# Patient Record
Sex: FEMALE | Race: WHITE
[De-identification: names, ages, dates, MRNs, and addresses within clinical notes are randomized per-mention and may not be internally consistent; named-entity substitution may affect disease eponyms.]

---

## 2017-12-22 ENCOUNTER — HOSPITAL ENCOUNTER (EMERGENCY)
Dept: HOSPITAL 80 - CED | Age: 62
Discharge: HOME | End: 2017-12-22
Payer: COMMERCIAL

## 2017-12-22 VITALS
OXYGEN SATURATION: 97 % | SYSTOLIC BLOOD PRESSURE: 128 MMHG | TEMPERATURE: 99.3 F | DIASTOLIC BLOOD PRESSURE: 70 MMHG | HEART RATE: 83 BPM | RESPIRATION RATE: 20 BRPM

## 2017-12-22 DIAGNOSIS — J10.1: Primary | ICD-10-CM

## 2017-12-22 NOTE — EDPHY
H & P


Time Seen by Provider: 12/22/17 09:32


HPI/ROS: 





CHIEF COMPLAINT:  Cough, body aches, back pain





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 

emergency department with multiple complaints. She states she has developed a 

significant nonproductive cough for the past 2 days.  She now has diffuse body 

aches.  She describes back pain.  She has noticed increased heart rate when she 

is walking up stairs.  She has no leg pain or swelling.  No abdominal pain. No 

nausea vomiting. She denies fevers or chills. Patient states she is concerned 

she has pneumonia or the flu.





REVIEW OF SYSTEMS:  


My complete review of systems is negative except as mentioned in the HPI.


Past Medical/Surgical History: 





Includes Sjogren syndrome, hypothyroidism, depression, carrier of 

hemochromatosis





Past surgical history:  Includes cataract surgery, retinal detachment, 

appendectomy, oophorectomy, C-spine fusion





Smoking Status: Never smoked


Physical Exam: 





36, 110/77, 97, 16, 96% on room air


GENERAL:  No acute distress, alert.


HEENT:  Eyes normal to inspection, normal pharynx, no signs of dehydration.


NECK:  No thyromegaly, no lymphadenopathy, supple.


RESPIRATORY:  Clear to auscultation bilaterally, no rales, rhonchi or wheezing.


CVS:  Regular rate and rhythm, no rubs, murmurs, or gallops.


ABDOMEN:  Soft, nontender, nondistended, no organomegaly.


BACK:  Normal to inspection, no CVA tenderness.


SKIN:  Normal color, no rash, warm, dry.  No pallor.


EXTREMITIES:  No pedal edema, no calf tenderness, no Homans sign or cords, no 

joint swelling.


NEURO/PSYCH:  Alert and oriented x3, normal mood and affect, normal motor 

sensory exam.  No obvious cranial nerve deficit.


Constitutional: 





 Initial Vital Signs











Temperature (C)  36 C   12/22/17 09:33


 


Heart Rate  97   12/22/17 09:33


 


Respiratory Rate  16   12/22/17 09:33


 


Blood Pressure  110/77   12/22/17 09:33


 


O2 Sat (%)  96   12/22/17 09:33








 











O2 Delivery Mode               Room Air














Allergies/Adverse Reactions: 


 





SANCERT Allergy (Uncoded 12/22/17 09:36)


 








Home Medications: 














 Medication  Instructions  Recorded


 


Cevimeline HCl [Evoxac]  09/13/17


 


Escitalopram Oxalate [Lexapro 10  09/13/17





MG]  


 


Levoxyl  09/13/17


 


Oseltamivir Phosphate [Tamiflu] 75 mg PO BID 5 Days  capsule 12/22/17


 


Wellbutrin     12/22/17














Medical Decision Making





- Diagnostics


Imaging Results: 





 Imaging Impressions





Chest X-Ray  12/22/17 09:25


Impression: Mild airways disease. No pneumonia.


 











ED Course/Re-evaluation: 





In the emergency department I discussed possible etiologies with the patient.  

Chest x-ray and flu swab was ordered.





Chest x-ray:  Please refer the dictated report by Dr. Oppenheimer.  No focal 

infiltrate.





Patient was flu a positive.





I discussed the results with the patient.  Patient was given warnings and 

instructions for return.  She will be given a prescription of Tamiflu.  She is 

given warnings prior to leaving.


Differential Diagnosis: 





My differential includes but is not limited to pneumonia, influenza, bronchitis

, bacteremia, sepsis





- Data Points


Laboratory Results: 





 











  12/22/17





  09:20


 


Influenza A,B Rapid  POSITIVE FOR FLU A  H 





   (NEGATIVE) 














Departure





- Departure


Disposition: Home, Routine, Self-Care


Clinical Impression: 


 Influenza A





Condition: Good


Instructions:  Influenza (ED)


Additional Instructions: 


You had a positive influenza a test.  Your x-ray did not show any focal 

infiltrates (pneumonia) at this time.  Return with increasing shortness of 

breath, worsening body aches, or any other concerns.


Referrals: 


MARV ROSALES [Primary Care Provider] - 5-7 days, call for appt.


Prescriptions: 


Oseltamivir Phosphate [Tamiflu] 75 mg PO BID 5 Days  capsule

## 2018-05-09 ENCOUNTER — HOSPITAL ENCOUNTER (EMERGENCY)
Dept: HOSPITAL 80 - CED | Age: 63
Discharge: HOME | End: 2018-05-09
Payer: COMMERCIAL

## 2018-05-09 VITALS — SYSTOLIC BLOOD PRESSURE: 120 MMHG | DIASTOLIC BLOOD PRESSURE: 69 MMHG

## 2018-05-09 DIAGNOSIS — G56.22: Primary | ICD-10-CM

## 2018-05-09 PROCEDURE — L3925 FO PIP DIP JNT/SPRNG PRE OTS: HCPCS

## 2018-05-09 NOTE — EDPHY
H & P


Stated Complaint: c/o  Little finger weaknes, numbness and inc. neck pain since 

this am


Time Seen by Provider: 05/09/18 15:12


HPI/ROS: 





CHIEF COMPLAINT:  Paresthesias and weakness





HISTORY OF PRESENT ILLNESS:  The patient is referred to the emergency 

department for evaluation of paresthesias and weakness involving the left hand.

  The patient has a history of prior cervical spine fusion performed in 2008.  

She has been experiencing intermittent worsening neck pain and decreased range 

of motion for some time.  She is currently under the care of a neurosurgeon in 

[place].  The patient did have recent cervical spine x-rays which 

demonstrated the possibility of a loosening hardware at C7.





The patient reports that today she is notice paresthesias involving her 4th and 

5th fingers.  She is having inability to flex and extend her left 5th finger.





REVIEW OF SYSTEMS:


A comprehensive 10 point review of systems is otherwise negative aside from 

elements mentioned in the history of present illness.


Source: Patient


Exam Limitations: No limitations





- Personal History


Current Tetanus Diphtheria and Acellular Pertussis (TDAP): No


Tetanus Vaccine Date: < 10 YEARS





- Medical/Surgical History


Hx Asthma: No


Hx Chronic Respiratory Disease: No


Hx Diabetes: No


Hx Cardiac Disease: No


Hx Renal Disease: No


Hx Cirrhosis: No


Hx Alcoholism: No


Hx HIV/AIDS: No


Hx Splenectomy or Spleen Trauma: No


Other PMH: sjogrens, hypothyroid, depression





- Social History


Smoking Status: Never smoked





- Physical Exam


Exam: 





General Appearance:  Alert, no distress


Respiratory:  There are no retractions, lungs are clear to auscultation


Cardiovascular:  Regular rate and rhythm


Gastrointestinal:  Abdomen is soft and nontender, no masses, bowel sounds normal


Neurological:  Patient is notice have decreased sensation to light touch along 

the 5th and 4th left fingers.  The patient is unable to fully extend her left 

5th finger at the DIP joint.  She also is having decreased range of motion with 

flexion to the 4th and 5th fingers.


Skin:  Warm and dry, no rashes


Musculoskeletal:  Neck is supple nontender


Extremities:  symmetrical, full range of motion








Constitutional: 


 Initial Vital Signs











Temperature (C)  36.6 C   05/09/18 15:15


 


Heart Rate  74   05/09/18 15:15


 


Respiratory Rate  18   05/09/18 15:15


 


Blood Pressure  139/88 H  05/09/18 15:15


 


O2 Sat (%)  96   05/09/18 15:15








 











O2 Delivery Mode               Room Air














Allergies/Adverse Reactions: 


 





SANCERT Allergy (Uncoded 12/22/17 09:36)


 








Home Medications: 














 Medication  Instructions  Recorded


 


NK [No Known Home Meds]  05/09/18














Medical Decision Making





- Diagnostics


Imaging Results: 


 Imaging Impressions





Cervical Spine MRI  05/09/18 16:41


Impression: No source for upper extremity weakness identified. 


 


Results called to Dr. Jeison Díaz at 6:22 PM.


 


 











ED Course/Re-evaluation: 





The patient presents to the ED with a neuropathy that appears to be an ulnar 

distribution.  Given her history of cervical spine fusion a MRI of the neck was 

ordered which demonstrates no significant changes from her prior MRI 1 year 

ago.  The patient's examination appears to be most consistent with a ulnar 

peripheral neuropathy.  The patient will be placed in a supportive splint.  I 

have asked her to follow up with our on-call neurologist for consideration of 

EMG testing.  The patient has been instructed to return to the ED for the 

development of any new neurologic symptoms as this would not be expected with a 

diagnosis of a peripheral neuropathy of the ulnar nerve.








Differential Diagnosis: 





Differential diagnosis considered includes cervical stenosis, cervical 

radiculopathy, peripheral neuropathy, myopathy





Departure





- Departure


Disposition: Home, Routine, Self-Care


Clinical Impression: 


Ulnar nerve neuropathy


Qualifiers:


 Laterality: left Qualified Code(s): G56.22 - Lesion of ulnar nerve, left upper 

limb





Condition: Good


Instructions:  Peripheral Neuropathy (ED), Paresthesia (ED)


Additional Instructions: 


1. Please wear splint for support.


2. Please schedule a follow-up appointment with a neurologist, Dr. Ronen Rico

, you have been referred to for further evaluation of a possible ulnar nerve 

neuropathy.


3. I would not expect you develop any new symptoms of numbness or weakness 

outside of your left upper extremity.  Please return to the ED for the 

development of any new numbness or weakness .








Referrals: 


Ronen Rico, DO [Medical Doctor] - As per Instructions

## 2018-05-09 NOTE — EDPHY
H & P


Time Seen by Provider: 05/09/18 15:12


HPI/ROS: 





HPI





CHIEF COMPLAINT:  Neck pain, trouble fingers and left hand





HISTORY OF PRESENT ILLNESS:  Patient is a 62-year-old female she is otherwise 

healthy, she had a remote history of cervical spinal fusion done approximately 

10 years ago, she presents emergency room stating that she is due to have a 

revision of this spinal fusion and is currently undergoing consultation with a 

neurosurgeon.  However this morning she woke up with some worsening neck pain 

and trouble with her 4th and 5th digit on the left hand.  They are somewhat 

hyperreflexic in.  She states they are numb.  She is concerned that her neck 

may be getting worse.


She denies any chest pain or shortness of breath, denies headache.  Denies arm 

weakness.


She does report numbness and tingling in her 4th and 5th digit.  And that they 

are weak.  The 5th digit is noted to be flexed in.  She does have some 

radicular pain from her neck on the left side going down her left arm.





Past Medical History:  Sjogren's, carrier of hemochromatosis





Past Surgical History:  Cervical spine fusion, overactive me, retinal detachment

, cataract, appendectomy





Social History:  Denies drugs alcohol tobacco.





Family History:








ROS   


REVIEW OF SYSTEMS:


A comprehensive 10 point review of systems is otherwise negative aside from 

elements mentioned in the history of present illness.








Exam   


Constitutional   triage nursing summary reviewed, vital signs reviewed, awake/

alert. 


Eyes   normal conjunctivae and sclera, EOMI, PERRLA. 


HENT   normal inspection, atraumatic, moist mucus membranes, no epistaxis, neck 

supple/ no meningismus, no raccoon eyes. 


Respiratory   clear to auscultation bilaterally, normal breath sounds, no 

respiratory distress, no wheezing. 


Cardiovascular   rate normal, regular rhythm, no murmur, no edema, distal 

pulses normal. 


Gastrointestinal   soft, non-tender, no rebound, no guarding, normal bowel 

sounds, no distension, no pulsatile mass. 


Genitourinary   no CVA tenderness. 


Musculoskeletal  no midline vertebral tenderness, full range of motion, no calf 

swelling, no tenderness of extremities, no meningismus, good pulses, 

neurovascularly intact.


Skin   pink, warm, & dry, no rash, skin atraumatic. 


Neurologic   Left ARM:  This is a good distal pulse.  Somewhat weak  

strength on the left hand, additionally the 4th and 5th digit have some 

paresthesias, as well as appear to be flexed, and somewhat weaker then the 

other fingers. 


 awake, alert and oriented x 3, AAOx3, moves all 4 extremities equally, motor 

intact, sensory intact, CN II-XII intact, normal cerebellar, normal vision, 

normal speech. 


Psychiatric   normal mood/affect. 


Heme/Lymph/Immune   no lymphadenopathy.





Differential Diagnosis:  Includes not limited to in a particular order ulnar 

nerve neuropathy, complication of cervical fusion, hardware loosening, nerve 

root compression





Medical Decision Making:  After lengthy discussion with the patient recommend 

urgent MRI either [facility] emergency room or [facility] 

emergency room the patient prefers to go to [facility] 

emergency room.


Plan will be for urgent neck MRI to evaluate the weakness in her left hand that 

appears to be in the ulnar distribution.





I spoke with Dr. Jeison Díaz who is the attending over at [facility] ER who will see and evaluate the patient for most likely 

MRI of the cervical spine.





Re-evaluation:








Patient can be safely discharged from [facility] Emergency room 

she will go by private vehicle over to [facility] ER.  They 

are expecting her.


Source: Patient





- Personal History


Tetanus Vaccine Date: < 10 YEARS





- Medical/Surgical History


Hx Asthma: No


Hx Chronic Respiratory Disease: No


Hx Diabetes: No


Hx Cardiac Disease: No


Hx Renal Disease: No


Hx Cirrhosis: No


Hx Alcoholism: No


Hx HIV/AIDS: No


Hx Splenectomy or Spleen Trauma: No


Other PMH: sjogrens, hypothyroid, depression





- Social History


Smoking Status: Never smoked


Constitutional: 


 Initial Vital Signs











Temperature (C)  36.6 C   05/09/18 15:15


 


Heart Rate  74   05/09/18 15:15


 


Respiratory Rate  18   05/09/18 15:15


 


Blood Pressure  139/88 H  05/09/18 15:15


 


O2 Sat (%)  96   05/09/18 15:15








 











O2 Delivery Mode               Room Air














Allergies/Adverse Reactions: 


 





SANCERT Allergy (Uncoded 12/22/17 09:36)


 








Home Medications: 














 Medication  Instructions  Recorded


 


NK [No Known Home Meds]  05/09/18














Departure





- Departure


Disposition: Home, Routine, Self-Care


Clinical Impression: 


Ulnar nerve neuropathy


Qualifiers:


 Laterality: left Qualified Code(s): G56.22 - Lesion of ulnar nerve, left upper 

limb





Condition: Good


Instructions:  Paresthesia (ED), Peripheral Neuropathy (ED)


Additional Instructions: 


1. Please go to [facility] emergency room.  They are expecting you.


Referrals: 


Carolyne Ambrosio PA [Primary Care Provider] - As per Instructions

## 2018-07-31 ENCOUNTER — HOSPITAL ENCOUNTER (INPATIENT)
Dept: HOSPITAL 80 - F3N | Age: 63
LOS: 3 days | Discharge: HOME HEALTH | DRG: 454 | End: 2018-08-03
Attending: NEUROLOGICAL SURGERY | Admitting: NEUROLOGICAL SURGERY
Payer: COMMERCIAL

## 2018-07-31 DIAGNOSIS — M19.042: ICD-10-CM

## 2018-07-31 DIAGNOSIS — M96.0: ICD-10-CM

## 2018-07-31 DIAGNOSIS — Z98.1: ICD-10-CM

## 2018-07-31 DIAGNOSIS — M19.041: ICD-10-CM

## 2018-07-31 DIAGNOSIS — M48.02: Primary | ICD-10-CM

## 2018-07-31 PROCEDURE — C1762 CONN TISS, HUMAN(INC FASCIA): HCPCS

## 2018-07-31 PROCEDURE — C1713 ANCHOR/SCREW BN/BN,TIS/BN: HCPCS

## 2018-07-31 RX ADMIN — DIAZEPAM PRN MG: 5 INJECTION, SOLUTION INTRAMUSCULAR; INTRAVENOUS at 17:20

## 2018-07-31 RX ADMIN — FAMOTIDINE SCH MG: 20 TABLET, FILM COATED ORAL at 20:41

## 2018-07-31 RX ADMIN — MORPHINE SULFATE SCH MG: 15 TABLET, FILM COATED, EXTENDED RELEASE ORAL at 20:41

## 2018-07-31 RX ADMIN — GABAPENTIN SCH MG: 300 CAPSULE ORAL at 22:00

## 2018-07-31 RX ADMIN — ONDANSETRON PRN MG: 4 TABLET, ORALLY DISINTEGRATING ORAL at 22:53

## 2018-07-31 RX ADMIN — Medication SCH MLS: at 21:52

## 2018-07-31 RX ADMIN — Medication SCH MG: at 20:45

## 2018-07-31 RX ADMIN — SODIUM CHLORIDE SCH MLS: 900 INJECTION, SOLUTION INTRAVENOUS at 18:45

## 2018-07-31 RX ADMIN — POLYETHYLENE GLYCOL 3350 SCH: 17 POWDER, FOR SOLUTION ORAL at 22:52

## 2018-07-31 RX ADMIN — Medication SCH MG: at 20:41

## 2018-07-31 RX ADMIN — DOCUSATE SODIUM AND SENNOSIDES SCH TAB: 50; 8.6 TABLET ORAL at 20:41

## 2018-07-31 RX ADMIN — ACETAMINOPHEN SCH MG: 500 TABLET ORAL at 22:01

## 2018-07-31 NOTE — SOAPPROG
SOAP Progress Note


Assessment/Plan: 


Assessment: 62 yo F sp C3/4, redo C4/5, C6/7 ACDF and C3-7 posterior fusion 


























Plan:


stable


hard collar


confirm dobhoff placement with KUB, start tubefeeds once placement confirmed


PT/OT


please call with neuro changes








07/31/18 16:29





Subjective: 





+ neck pain, no arm pain 


Objective: 





 Vital Signs











Temp Pulse Resp BP Pulse Ox


 


 36.3 C   70   16   134/81 H  96 


 


 07/31/18 09:52  07/31/18 10:52  07/31/18 10:52  07/31/18 10:52  07/31/18 10:52








somnolent


PERRL, no facial droop


WILL x 4


+ light touch 





ICD10 Worksheet


Patient Problems: 


 Problems











Problem Status Onset


 


Fusion of spine of cervical region Acute  














- ICD10 Problem Qualifiers


(1) Fusion of spine of cervical region

## 2018-07-31 NOTE — GOP
[f 
rep st]



                                                                OPERATIVE REPORT





DATE OF OPERATION:  07/31/2018



SURGEON:  Dorian Ramirez MD



NEUROSURGEON:  Dorian Rmairez MD



ASSISTANT:  Juan Retana PA-C.



ANESTHESIA:  General endotracheal.



PREOPERATIVE DIAGNOSIS:  

1.  Intractable neck pain, failed conservative care.  

2.  C3-4 cervical stenosis and neural foraminal encroachment.

3.  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 
anterior cervical decompression and stabilization/fusion with plating. 

4.  Retained hardware.



POSTOPERATIVE DIAGNOSIS:  

1.  Intractable neck pain, failed conservative care.  

2.  C3-4 cervical stenosis and neural foraminal encroachment.

3.  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 
anterior cervical decompression and stabilization/fusion with plating. 

4.  Retained hardware.



PROCEDURE PERFORMED:  

1.  Removal of anterior cervical plate from C4 through C7 with exploration of 
spinal fusion at C5-6. 

2.  C4-5 and C6-7 redo anterior cervical diskectomy and arthrodesis with 2 
structural PEEK interbody spacers.  

3.  New C3-4 anterior cervical diskectomy and arthrodesis with a structural 
PEEK interbody spacer.

4.  Placement of a C3 through c5 and C6-7 anterior cervical plates and screws.

5.  Use of intraoperative microscopy and fluoroscopy.



FINDINGS:  





ESTIMATED BLOOD LOSS:  75 cc.



INDICATIONS:  The patient is a 63-year-old woman with intractable neck pain and 
left upper extremity radicular symptoms secondary to C4-5 and C6-7 
pseudarthrosis/nonunions and cervical degeneration and stenosis with neural 
foraminal impingement at C3-4.  She has failed extensive conservative care and 
presents now for removal and replacement of hardware and exploration of fusion 
and redo two-level anterior cervical diskectomy, arthrodesis and a new anterior 
cervical decompression and fusion at C3-4.



DESCRIPTION OF PROCEDURE:  After informed consent was obtained, the patient was 
taken to the operating room and placed in the supine position with the head in 
the halter retractor system.  The anterior cervical region was prepped and 
draped in sterile fashion.  After fluoroscopic localization of the correct 
levels, the subcutaneous and intramuscular tissues were infiltrated with local 
anesthesia.  A horizontal linear incision was then created at approximately C5.
  This was carried through the platysmal layer using monopolar electrocautery 
and carried in the avascular plane between the sternocleidomastoid and carotid 
sheath laterally and the strap muscles, trachea, and esophagus medially down to 
the prevertebral fascia, which was carefully incised with Metzenbaum scissors.  
The prior instrumentation was identified and carefully dissected out along with 
the C3-4 interspace where the large anteriorly protruding osteophyte was 
carefully removed.  The lower portion of the plate could not be exposed with 
this incision, so a 2nd horizontal incision was created a couple centimeters 
below this.  The bottom of the plate was exposed through this.  The prior 
screws were carefully removed in the standard fashion and the holes filled with 
Gelfoam.  The distraction pins were then serially inserted, 1st at C3-4, then 
at C4-5, then at C6-7, during which time complete diskectomies were performed 
with removal of posterior longitudinal ligament at C3-4.  Bilateral 
foraminotomies were performed with special attention focused on the left side, 
where the nerve root was thoroughly opened along with the spinal canal which 
was extensively decompressed with removal of the posteriorly protruding 
osteophytes.  Following adequate decompression, the remaining endplates were 
carefully prepared and an appropriately sized structural PEEK interbody spacer 
was packed in the interspace under fluoroscopic image guidance.  The C4-5 and C6
-7 levels were then addressed and the nonunion was carefully drilled down under 
fluoroscopic image guidance and quite a bit of pseudarthrotic material was 
found at both locations.  All the screws drilled out and any good bone was 
saved for replacement and the nonunion material was discarded.  Both the C4-5 
and the C6-7 levels were packed with structural PEEK interbody spacer, packed 
with local autograft along with demineralized bone matrix in the centers.  The 
distraction was removed and an appropriately sized [employer] 
anterior cervical plate was 1st placed from C3 through C5 with self-drilling 
screws and then a 2nd plate at C6-7.  After verification of good position of 
the screws and plates, the locking mechanisms were engaged.  A drain was 
placed.  The subcutaneous and intramuscular tissues were re-infiltrated with 
local anesthesia.  The wound was closed in a layered fashion using interrupted 
Vicryl sutures followed by Steri-Strips on the skin.



COMPLICATIONS:  None.



DISPOSITION:  The patient remained intubated and was repositioned prone for the 
posterior portion of the operation.





Job #:  [id number]

MTDD

## 2018-07-31 NOTE — PDANEPAE
ANE Past Medical History





- Cardiovascular History


Hx Hypertension: No


Hx Arrhythmias: No


Hx Chest Pain: No


Hx Coronary Artery / Peripheral Vascular Disease: No


Hx CHF / Valvular Disease: No


Hx Palpitations: No





- Pulmonary History


Hx COPD: No


Hx Asthma/Reactive Airway Disease: No


Hx Recent Upper Respiratory Infection: No


Hx Oxygen in Use at Home: No


Hx Sleep Apnea: No


Sleep Apnea Screening Result - Last Documented: Negative





- Neurologic History


Hx Cerebrovascular Accident: No


Hx Seizures: No


Hx Dementia: No


Neurologic History Comment: migraines- have gotten better with menopause





- Endocrine History


Hx Diabetes: No


Endocrine History Comment: hypothyroidism.  sjogrens disease- extremely dry 

mouth





- Renal History


Hx Renal Disorders: Yes


Renal History Comment: hx of urinary retention with surgeries





- Liver History


Hx Hepatic Disorders: No





- Neurological & Psychiatric Hx


Hx Neurological and Psychiatric Disorders: Yes


Neurological / Psychiatric History Comment: depression





- Cancer History


Hx Cancer: Yes


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





- Congenital Disorder History


Hx Congenital Disorders: No





- GI History


Hx Gastrointestinal Disorders: Yes


Gastrointestinal History Comment: chronic constipation uses mg and colace





- Other Health History


Other Health History: wears glasses.  dental implants and crowns.  carrier for 

hemochromotosis





- Chronic Pain History


Chronic Pain: Yes (neck pain and migraines)





- Surgical History


Prior Surgeries: appy with right oophorectomy 01/2013.  c-spine surgery 04/ 2008.  left vitrectomy 09/2017.  bilateral catarats 12/2017, 01/2018.  left 

pinky surgery





ANE Review of Systems


Review of Systems: 








- Exercise capacity


METS (RN): 4 METS





ANE Patient History





- Allergies


Allergies/Adverse Reactions: 








methysergide [From Sansert] Allergy (Verified 07/24/18 11:01)


 caused urinary retention








- Home Medications


Home Medications: 








Cevimeline HCl [Evoxac] 30 mg PO TID 06/22/18 [Last Taken 07/31/18 06:00]


Escitalopram Oxalate [Lexapro] 20 mg PO DAILY 06/22/18 [Last Taken 07/31/18 06:

00]


Estrogen Cream 1 alden TD HS 06/22/18 [Last Taken 07/30/18]


Levothyroxine [Synthroid 88 mcg (*)] 88 mcg PO DAILY06 06/22/18 [Last Taken 07/ 31/18 06:00]


Progesterone Cap 250 mg PO HS 06/22/18 [Last Taken 07/30/18 21:00]


Docusate Sodium 250 mg PO HS 07/23/18 [Last Taken 07/30/18 21:00]


Herbals/Supplements -Info Only 1 ea PO DAILY 07/23/18 [Last Taken 07/24/18]


Magnesium Oxide [Magnesium Oxide 400 mg (*)] 200 mg PO DAILY 07/23/18 [Last 

Taken 07/31/18 06:00]


Magnesium Oxide [Magnesium Oxide 400 mg (*)] 400 mg PO HS 07/23/18 [Last Taken 

07/30/18 21:00]








- NPO status


NPO Since - Liquids (Date): 07/30/18


NPO Since - Liquids (Time): 07:00


NPO Since - Solids (Date): 07/30/18


NPO Since - Solids (Time): 21:00





- Smoking Hx


Smoking Status: Never smoked





- Family Anes Hx


Family Hx Anesthesia Complications: none





ANE Labs/Vital Signs





- Vital Signs


Blood Pressure: 134/81


Heart Rate: 70


Respiratory Rate: 16


O2 Sat (%): 96


Height: 167.64 cm


Weight: 50.802 kg





ANE Physical Exam





- Airway


Mallampati Score: Class 2


Mouth exam: small mouth opening





- ASA Status


ASA Status: II





ANE Anesthesia Plan


Anesthesia Plan: general endotracheal anesthesia


Urgent/Emergent Case: Jackelin sotomayor completed preop but documented later for safe 

timely pt care

## 2018-08-01 LAB — PLATELET # BLD: 158 10^3/UL (ref 150–400)

## 2018-08-01 RX ADMIN — OXYCODONE HYDROCHLORIDE PRN MG: 15 TABLET ORAL at 21:57

## 2018-08-01 RX ADMIN — MORPHINE SULFATE SCH MG: 15 TABLET, FILM COATED, EXTENDED RELEASE ORAL at 21:57

## 2018-08-01 RX ADMIN — OXYCODONE HYDROCHLORIDE PRN MG: 15 TABLET ORAL at 07:42

## 2018-08-01 RX ADMIN — OXYCODONE HYDROCHLORIDE PRN MG: 15 TABLET ORAL at 13:31

## 2018-08-01 RX ADMIN — FAMOTIDINE SCH MG: 20 TABLET, FILM COATED ORAL at 21:58

## 2018-08-01 RX ADMIN — DOCUSATE SODIUM AND SENNOSIDES SCH: 50; 8.6 TABLET ORAL at 22:04

## 2018-08-01 RX ADMIN — LEVOTHYROXINE SODIUM SCH MCG: 0.09 TABLET ORAL at 05:40

## 2018-08-01 RX ADMIN — Medication SCH MLS: at 05:40

## 2018-08-01 RX ADMIN — SODIUM CHLORIDE SCH MLS: 900 INJECTION, SOLUTION INTRAVENOUS at 05:41

## 2018-08-01 RX ADMIN — FAMOTIDINE SCH MG: 20 TABLET, FILM COATED ORAL at 07:46

## 2018-08-01 RX ADMIN — Medication SCH MG: at 21:57

## 2018-08-01 RX ADMIN — POLYETHYLENE GLYCOL 3350 SCH: 17 POWDER, FOR SOLUTION ORAL at 13:32

## 2018-08-01 RX ADMIN — SODIUM CHLORIDE SCH MLS: 900 INJECTION, SOLUTION INTRAVENOUS at 10:53

## 2018-08-01 RX ADMIN — GABAPENTIN SCH MG: 300 CAPSULE ORAL at 21:58

## 2018-08-01 RX ADMIN — ACETAMINOPHEN SCH MG: 500 TABLET ORAL at 05:41

## 2018-08-01 RX ADMIN — METHOCARBAMOL PRN MG: 750 TABLET ORAL at 13:30

## 2018-08-01 RX ADMIN — ACETAMINOPHEN SCH MG: 500 TABLET ORAL at 21:58

## 2018-08-01 RX ADMIN — GABAPENTIN SCH MG: 300 CAPSULE ORAL at 05:40

## 2018-08-01 RX ADMIN — METHOCARBAMOL PRN MG: 750 TABLET ORAL at 18:16

## 2018-08-01 RX ADMIN — DOCUSATE SODIUM AND SENNOSIDES SCH TAB: 50; 8.6 TABLET ORAL at 07:38

## 2018-08-01 RX ADMIN — POLYETHYLENE GLYCOL 3350 SCH: 17 POWDER, FOR SOLUTION ORAL at 22:04

## 2018-08-01 RX ADMIN — POLYETHYLENE GLYCOL 3350 SCH GM: 17 POWDER, FOR SOLUTION ORAL at 07:41

## 2018-08-01 RX ADMIN — METHOCARBAMOL PRN MG: 750 TABLET ORAL at 07:45

## 2018-08-01 RX ADMIN — MORPHINE SULFATE SCH MG: 15 TABLET, FILM COATED, EXTENDED RELEASE ORAL at 07:41

## 2018-08-01 RX ADMIN — ONDANSETRON PRN MG: 4 TABLET, ORALLY DISINTEGRATING ORAL at 18:10

## 2018-08-01 RX ADMIN — ONDANSETRON PRN MG: 4 TABLET, ORALLY DISINTEGRATING ORAL at 21:21

## 2018-08-01 RX ADMIN — ACETAMINOPHEN SCH MG: 500 TABLET ORAL at 13:29

## 2018-08-01 RX ADMIN — GABAPENTIN SCH MG: 300 CAPSULE ORAL at 13:30

## 2018-08-01 RX ADMIN — ONDANSETRON PRN MG: 4 TABLET, ORALLY DISINTEGRATING ORAL at 13:28

## 2018-08-01 RX ADMIN — Medication SCH: at 07:49

## 2018-08-01 NOTE — SOAPPROG
SOAP Progress Note


Assessment/Plan: 


Assessment: 64 yo F POD #1 C3/4, redo C4/5, C6/7 ACDF and C3-7 posterior fusion 


























Plan:


stable


hard collar


confirm dobhoff placement with KUB, start tubefeeds once placement confirmed, 

will have dietary and speech evaluate patient


hx of urinary retention, will keep carney one more day


x-rays today 


PT/OT


please call with neuro changes








07/31/18 16:29





08/01/18 07:24





Subjective: 





some neck pain, no arm pain or paresthesias, patient swallowing ok


Objective: 





 Vital Signs











Temp Pulse Resp BP Pulse Ox


 


 36.9 C   75   15   86/55 L  96 


 


 08/01/18 03:49  08/01/18 05:45  08/01/18 03:49  08/01/18 05:45  08/01/18 05:45








 Laboratory Results





 08/01/18 05:20 





 08/01/18 05:20 





 











 07/31/18 08/01/18 08/02/18





 05:59 05:59 05:59


 


Intake Total  4320 


 


Output Total  1160 


 


Balance  3160 








AAOx4, +FC


PERRL, No facial droop


5/5


+ light touch


C/D/I x 2





ICD10 Worksheet


Patient Problems: 


 Problems











Problem Status Onset


 


Fusion of spine of cervical region Acute  














- ICD10 Problem Qualifiers


(1) Fusion of spine of cervical region

## 2018-08-01 NOTE — ASMTCMCOM
CM Note

 

CM Note                       

Notes:

Chart reviewed, pt had planned surgery. OT rec home, SLP rec home. PT eval pending. Pt does not 

require feeding tube. Resides with . CM to follow for d/c planning. 

 

Date Signed:  08/01/2018 04:22 PM

Electronically Signed By:VIDHYA Martínez

## 2018-08-01 NOTE — PDMN
Medical Necessity


Medical necessity: [facility] S330 Cervical Fusion, Posterior Medicare INPT Only, S320

, Cervical Fusion, Anterior. 62 y/o s/p C3/4 w/ redo C4/5 cervical fusion 

anterior; C3/7 cervical fusion posterior instrumentation w/ stealth; C4/7 

cervical hardware removal anterior.

## 2018-08-02 RX ADMIN — Medication SCH MG: at 13:28

## 2018-08-02 RX ADMIN — HYDROMORPHONE HYDROCHLORIDE PRN MG: 2 TABLET ORAL at 19:07

## 2018-08-02 RX ADMIN — FAMOTIDINE SCH MG: 20 TABLET, FILM COATED ORAL at 20:50

## 2018-08-02 RX ADMIN — ONDANSETRON PRN MG: 4 TABLET, ORALLY DISINTEGRATING ORAL at 07:05

## 2018-08-02 RX ADMIN — DIAZEPAM PRN MG: 5 INJECTION, SOLUTION INTRAMUSCULAR; INTRAVENOUS at 21:16

## 2018-08-02 RX ADMIN — DOCUSATE SODIUM AND SENNOSIDES SCH TAB: 50; 8.6 TABLET ORAL at 07:57

## 2018-08-02 RX ADMIN — POLYETHYLENE GLYCOL 3350 SCH: 17 POWDER, FOR SOLUTION ORAL at 14:38

## 2018-08-02 RX ADMIN — ACETAMINOPHEN SCH MG: 500 TABLET ORAL at 06:19

## 2018-08-02 RX ADMIN — ONDANSETRON PRN MG: 4 TABLET, ORALLY DISINTEGRATING ORAL at 13:23

## 2018-08-02 RX ADMIN — Medication SCH MG: at 20:50

## 2018-08-02 RX ADMIN — OXYCODONE HYDROCHLORIDE PRN MG: 15 TABLET ORAL at 01:31

## 2018-08-02 RX ADMIN — OXYCODONE HYDROCHLORIDE PRN MG: 15 TABLET ORAL at 13:25

## 2018-08-02 RX ADMIN — OXYCODONE HYDROCHLORIDE PRN MG: 15 TABLET ORAL at 07:57

## 2018-08-02 RX ADMIN — DOCUSATE SODIUM AND SENNOSIDES SCH TAB: 50; 8.6 TABLET ORAL at 20:50

## 2018-08-02 RX ADMIN — POLYETHYLENE GLYCOL 3350 SCH GM: 17 POWDER, FOR SOLUTION ORAL at 20:49

## 2018-08-02 RX ADMIN — METHOCARBAMOL PRN MG: 750 TABLET ORAL at 07:58

## 2018-08-02 RX ADMIN — ONDANSETRON PRN MG: 4 TABLET, ORALLY DISINTEGRATING ORAL at 19:06

## 2018-08-02 RX ADMIN — METHOCARBAMOL PRN MG: 750 TABLET ORAL at 17:55

## 2018-08-02 RX ADMIN — METHOCARBAMOL PRN MG: 750 TABLET ORAL at 13:24

## 2018-08-02 RX ADMIN — POLYETHYLENE GLYCOL 3350 SCH GM: 17 POWDER, FOR SOLUTION ORAL at 07:56

## 2018-08-02 RX ADMIN — FAMOTIDINE SCH MG: 20 TABLET, FILM COATED ORAL at 07:56

## 2018-08-02 RX ADMIN — GABAPENTIN SCH MG: 300 CAPSULE ORAL at 06:19

## 2018-08-02 RX ADMIN — POLYETHYLENE GLYCOL 3350 SCH GM: 17 POWDER, FOR SOLUTION ORAL at 17:55

## 2018-08-02 RX ADMIN — HYDROMORPHONE HYDROCHLORIDE PRN MG: 2 TABLET ORAL at 17:10

## 2018-08-02 RX ADMIN — Medication SCH: at 08:04

## 2018-08-02 RX ADMIN — LEVOTHYROXINE SODIUM SCH MCG: 0.09 TABLET ORAL at 06:20

## 2018-08-02 RX ADMIN — OXYCODONE HYDROCHLORIDE PRN MG: 15 TABLET ORAL at 09:26

## 2018-08-02 RX ADMIN — METHOCARBAMOL PRN MG: 750 TABLET ORAL at 01:31

## 2018-08-02 NOTE — SOAPPROG
SOAP Progress Note


Assessment/Plan: 


Assessment: 64 yo F POD #2 C3/4, redo C4/5, C6/7 ACDF and C3-7 posterior fusion 


























Plan:


stable


hard collar


confirm dobhoff placement with KUB, start tubefeeds once placement confirmed, 

will have dietary and speech evaluate patient


will stop MScontin and other meds


hx of urinary retention, will remove acrney this am


post op x-rays look great 


PT/OT


please call with neuro changes


patient seen by Dr RODRIGUEZ today








07/31/18 16:29





08/01/18 07:24





08/02/18 07:26





Subjective: 





continued neck pain, no arm pain, no weakness


Objective: 





 Vital Signs











Temp Pulse Resp BP Pulse Ox


 


 36.9 C   66   15   99/57 L  90 L


 


 08/02/18 04:00  08/02/18 04:00  08/02/18 04:00  08/02/18 04:00  08/02/18 04:00








 Laboratory Results





 08/01/18 05:20 





 08/01/18 05:20 





 











 08/01/18 08/02/18 08/03/18





 05:59 05:59 05:59


 


Intake Total 4320 200 


 


Output Total 1160 805 


 


Balance 3160 -605 








AAOx4, +FC


PERRL, EOMI, no facial droop


5/5


+ light touch


C/D/I x 2





ICD10 Worksheet


Patient Problems: 


 Problems











Problem Status Onset


 


Fusion of spine of cervical region Acute  














- ICD10 Problem Qualifiers


(1) Fusion of spine of cervical region

## 2018-08-03 VITALS — SYSTOLIC BLOOD PRESSURE: 124 MMHG | DIASTOLIC BLOOD PRESSURE: 76 MMHG

## 2018-08-03 RX ADMIN — FAMOTIDINE SCH MG: 20 TABLET, FILM COATED ORAL at 08:29

## 2018-08-03 RX ADMIN — HYDROMORPHONE HYDROCHLORIDE PRN MG: 2 TABLET ORAL at 01:24

## 2018-08-03 RX ADMIN — HYDROMORPHONE HYDROCHLORIDE PRN MG: 2 TABLET ORAL at 06:41

## 2018-08-03 RX ADMIN — POLYETHYLENE GLYCOL 3350 SCH GM: 17 POWDER, FOR SOLUTION ORAL at 08:31

## 2018-08-03 RX ADMIN — DOCUSATE SODIUM AND SENNOSIDES SCH: 50; 8.6 TABLET ORAL at 08:38

## 2018-08-03 RX ADMIN — Medication SCH MG: at 11:03

## 2018-08-03 RX ADMIN — HYDROMORPHONE HYDROCHLORIDE PRN MG: 2 TABLET ORAL at 10:45

## 2018-08-03 RX ADMIN — Medication SCH: at 08:31

## 2018-08-03 RX ADMIN — DIAZEPAM PRN MG: 5 INJECTION, SOLUTION INTRAMUSCULAR; INTRAVENOUS at 11:34

## 2018-08-03 RX ADMIN — ENOXAPARIN SODIUM SCH MG: 100 INJECTION SUBCUTANEOUS at 08:49

## 2018-08-03 RX ADMIN — LEVOTHYROXINE SODIUM SCH MCG: 0.09 TABLET ORAL at 05:05

## 2018-08-03 RX ADMIN — HYDROMORPHONE HYDROCHLORIDE PRN MG: 2 TABLET ORAL at 15:56

## 2018-08-03 RX ADMIN — METHOCARBAMOL PRN MG: 750 TABLET ORAL at 15:17

## 2018-08-03 RX ADMIN — Medication SCH MG: at 11:14

## 2018-08-03 RX ADMIN — METHOCARBAMOL PRN MG: 750 TABLET ORAL at 05:04

## 2018-08-03 RX ADMIN — ONDANSETRON PRN MG: 4 TABLET, ORALLY DISINTEGRATING ORAL at 01:24

## 2018-08-03 RX ADMIN — ENOXAPARIN SODIUM SCH MG: 100 INJECTION SUBCUTANEOUS at 08:29

## 2018-08-03 NOTE — ASMTCMCOM
CM Note

 

CM Note                       

Notes:

Pt medically stable for d/c. 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 

up with outpatient therapies. No CM d/c needs identified. 

 

Date Signed:  08/03/2018 05:14 PM

Electronically Signed By:VIDHYA Martínez

## 2018-08-03 NOTE — NEUSURGPN
Assessment/Plan: 





Assessment: 64 yo F POD #3 C3/4, redo C4/5, C6/7 ACDF and C3-7 posterior fusion 








Plan:


stable


continue hard collar


Tolerate PO okay


Discussed alternating muscle relaxers Valium and Robaxin


hx of urinary retention, was replaced yesterday. Will d/c home with Aguayo and 

have her follow up as an outpatient


post op x-rays show intact hardware


PT/OT


please call with neuro changes


Discussed with Dr. RODRIGUEZ


Subjective: 


posterior neck burning pain


Objective: 


NAD A&Ox3 MAEx4 5/5 and equal in BUE and BLE. Dressing c.d.i, SUHAIL drains 

serosanguineous


Catheter Insertion Date: 07/31/18





- Physician


Discussed Patient with : James





Neurosurgery Physical Exam





- Vitals, I&O, Labs





 I and O











 08/02/18 08/03/18 08/04/18





 05:59 05:59 05:59


 


Intake Total 200 550 


 


Output Total 805 2460 


 


Balance -605 -1910 


 


Intake:   


 


  Oral (ml) 200 550 


 


Output:   


 


  Urine (ml) 550 2400 


 


    Catheter 550 2400 


 


  SUHAIL Drain Output (ml) 255 60 


 


    #1 Anterior Neck 15 10 


 


    #2 Posterior Neck 240 50 


 


Other:   


 


  Intake Quantity Yes Yes 





  Sufficient   


 


  Output Comment   


 


    Catheter  missed hat 


 


  Number of Voids   


 


    Catheter  1 


 


  Bladder Scan Volume (ml)   


 


    Catheter  212 








 Vital Signs











Temp Pulse Resp BP Pulse Ox


 


 36.2 C   69   14   118/74   92 


 


 08/03/18 07:35  08/03/18 07:35  08/03/18 07:35  08/03/18 07:35  08/03/18 07:35








 Laboratory Results





 08/01/18 05:20 





 08/01/18 05:20 











ICD10 Worksheet


Patient Problems: 


 Problems











Problem Status Onset


 


Fusion of spine of cervical region Acute

## 2018-08-03 NOTE — ASMTLACE
LACE

 

Length of stay for            Answers:  4-6 days                              

current admission                                                             

Acuity / Level of             Answers:  Yes                                   

Care: Did the patient                                                         

have an inpatient                                                             

admission?                                                                    

Comorbidities - select        Answers:  Opioid dependence                     

all that apply                          / Chronic pain                        

                                        Other                         Notes:  Hypothyroidism

# of Emergency department     Answers:  1-2                                   

visits in the last 6                                                          

months                                                                        

Social determinants           Answers:  Mental health diagnosis               

                                        (anxiety, depression, pers            

                                        onality disorders, etc.)              

Score: 16

 

Date Signed:  08/03/2018 05:12 PM

Electronically Signed By:VIDHYA Martínez

## 2018-08-03 NOTE — ASDISCHSUM
----------------------------------------------

Discharge Information

----------------------------------------------

Plan Status:Home with No Needs                       Medically Cleared to Leave:

Discharge Date:08/03/2018 04:45 PM                   CM D/C Disposition:Home, Routine, Self-Care

ADT D/C Disposition:Home Health Service              Projected Discharge Date:08/03/2018 04:45 PM

Transportation at D/C:                               Discharge Delay Reason:

Follow-Up Date:08/03/2018 04:45 PM                   Discharge Slot:

Final Diagnosis:

----------------------------------------------

Placement Information

----------------------------------------------

----------------------------------------------

Patient Contact Information

----------------------------------------------

Contact Name:CLEMENTINA                            Relationship:

Address:[address] [phone]

                                                     Work Phone:

City:[facility] Phone:

[facility]/Zip Code:[place] [address]                              Email:

----------------------------------------------

Financial Information

----------------------------------------------

Financial Class:[employer]

[facility] Desc[facility] [facility]       Primary Plan Number:[id number]

Secondary Plan Desc:                                 Secondary Plan Number:

 

 

----------------------------------------------

Assessment Information

----------------------------------------------

----------------------------------------------

LACE

----------------------------------------------

LACE

 

Length of stay for            Answers:  4-6 days                              

current admission                                                             

Acuity / Level of             Answers:  Yes                                   

Care: Did the patient                                                         

have an inpatient                                                             

admission?                                                                    

Comorbidities - select        Answers:  Opioid dependence                     

all that apply                          / Chronic pain                        

                                        Other                         Notes:  Hypothyroidism

# of Emergency department     Answers:  1-2                                   

visits in the last 6                                                          

months                                                                        

Social determinants           Answers:  Mental health diagnosis               

                                        (anxiety, depression, pers            

                                        onality disorders, etc.)              

Score: 16

 

Date Signed:  08/03/2018 05:12 PM

Electronically Signed By:VIDHYA Martínez

 

 

----------------------------------------------

[facility] ANA Progress Note

----------------------------------------------

CM Note

 

CM Note                       

Notes:

Chart reviewed, pt had planned surgery. OT rec home, SLP rec home. PT eval pending. Pt does not 

require feeding tube. Resides with . CM to follow for d/c planning. 

 

Date Signed:  08/01/2018 04:22 PM

Electronically Signed By:VIDHYA Martínez

 

 

----------------------------------------------

[facility] CM Progress Note

----------------------------------------------

CM Note

 

CM Note                       

Notes:

Pt medically stable for d/c. 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 

up with outpatient therapies. No CM d/c needs identified. 

 

Date Signed:  08/03/2018 05:14 PM

Electronically Signed By:VIDHYA Martínez

 

 

----------------------------------------------

Intervention Information

----------------------------------------------

## 2018-08-03 NOTE — PDIAF
- Diagnosis


Code Status: Full Code





- Medication Management


Discharge Medications: 


 Medications to Continue on Transfer





Cevimeline HCl [Evoxac] 30 mg PO TID 06/22/18 [Last Taken 07/31/18 06:00]


Escitalopram Oxalate [Lexapro] 20 mg PO DAILY 06/22/18 [Last Taken 07/31/18 06:

00]


Estrogen Cream 1 alden TD HS 06/22/18 [Last Taken 07/30/18]


Levothyroxine [Synthroid 88 mcg (*)] 88 mcg PO DAILY06 06/22/18 [Last Taken 07/ 31/18 06:00]


Progesterone Cap 250 mg PO HS 06/22/18 [Last Taken 07/30/18 21:00]


Docusate Sodium 250 mg PO HS 07/23/18 [Last Taken 07/30/18 21:00]


Herbals/Supplements -Info Only 1 ea PO DAILY 07/23/18 [Last Taken 07/24/18]


Magnesium Oxide [Magnesium Oxide 400 mg (*)] 200 mg PO DAILY 07/23/18 [Last 

Taken 07/31/18 06:00]


Magnesium Oxide [Magnesium Oxide 400 mg (*)] 400 mg PO HS 07/23/18 [Last Taken 

07/30/18 21:00]


Diazepam [Valium 5 MG (*)] 5 mg PO Q8 #60 tab 08/03/18 [Last Taken Unknown]


HYDROmorphone HCL [Dilaudid 2 mg (*)] 2 - 4 mg PO Q4H PRN #90 tab 08/03/18 [

Last Taken Unknown]


Methocarbamol [Robaxin 750 mg (*)] 750 mg PO QID PRN #60 tab 08/03/18 [Last 

Taken Unknown]


Sennosides/Docusate Sodium [Senokot-S] 1 - 2 tab PO BID  tab 08/03/18 [Last 

Taken Unknown]








Discharge Medications: Refer to the Discharge Home Medication list for PRN 

reason.





- Orders


Services needed: Registered Nurse, Physical Therapy, Occupational Therapy


Diet Texture: Regular Texture Diet, Thin Liquids, Meds Whole in Puree


Additional Instructions: 


Follow up with urology in 5-7 to evaluate for Aguayo catheter removal. Contineu 

ahrd collar at all tiem other than shower. Okay to shower tomorrow (8/4). 





- Follow Up Care


Current Providers and Referrals: 


MARV ROSALES [Primary Care Provider] -

## 2018-08-08 NOTE — GDS
[f rep st]



                                                             DISCHARGE SUMMARY





ADMISSION DIAGNOSES:  

1.  Cervical pseudoarthrosis.

2.  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



DISCHARGE DIAGNOSES:  

1.  Status post C4-C7 plate removal.

2.  C4-5 and C6-7 redo anterior cervical diskectomy and fusion.

3.  C3-4 anterior cervical diskectomy and fusion.

4.  C3-7 posterior instrumentation and fusion.



HISTORY AND PHYSICAL:  Please see admission history and physical.



HOSPITAL COURSE:  The patient is a 63-year-old female who presented with progressively worsening neck
 pain and headaches.  She was taken to the operating room on 07/31/2018, where she underwent the plat
e removal from C4 through 7, followed by a redo C4-5 and C6-7 anterior cervical diskectomy and a new 
C3-4 anterior cervical diskectomy and fusion, followed by a C3-7 posterior instrumentation and fusion
.  There were no intraoperative complications and she was admitted to the floor for observation.  



On the floor, she was tolerating a regular diet and her pain was controlled with p.o. pain medication
s.  She made slow progress with Physical Therapy and Occupational Therapy.  Her postoperative x-rays 
showed good position of the hardware.  Intraoperatively, she did have a Dobhoff that was placed, anti
cipating that she would have postoperative dysphagia.  She was able to tolerate a regular diet and th
e Dobhoff polyp was subsequently removed.



DISPOSITION:  She was discharged home in stable condition on 08/03/2018.



DISCHARGE INSTRUCTIONS:  Patient was discharged with postoperative instructions and recommended she r
dianeurn for neurosurgical followup appointment in 10 to 14 days.





Job #:  [id number]

## 2019-01-23 NOTE — GOP
[f rep st]



                                                                OPERATIVE REPORT





DATE OF OPERATION:  07/31/2018



SURGEON:  Dorian Ramirez MD



NEUROSURGEON:  Dorian Ramirez MD



ASSISTANT:  Juan Retana PA-C



ANESTHESIA:  General endotracheal.



PREOPERATIVE DIAGNOSIS:  

1.  Intractable neck pain, failed conservative care.  

2.  C3-4 cervical stenosis and neural foraminal encroachment.

3.  C4-5 and C6-7 pseudoarthrosis/nonunion, status post prior C4 through 7 anterior cervical decompre
ssion and stabilization/fusion with plating. 

4.  Retained hardware.



POSTOPERATIVE DIAGNOSIS:  

1.  Intractable neck pain, failed conservative care.  

2.  C3-4 cervical stenosis and neural foraminal encroachment.

3.  C4-5 and C6-7 pseudoarthrosis/nonunion, status post prior C4 through 7 anterior cervical decompre
ssion and stabilization/fusion with plating. 

4.  Retained hardware.



PROCEDURE PERFORMED:  

1.  C3 through C7 posterior segmental (pedicle screw and lateral mass screw) fixation and posterolate
ral fusion with local autograft and bone morphogenic protein.

2.  Use of intraoperative microscopy and fluoroscopy.



FINDINGS:  





ESTIMATED BLOOD LOSS:  75 cc.



INDICATIONS:  The patient is a 63-year-old woman with intractable neck pain and left upper extremity 
radicular symptoms secondary to C4-5 and C6-7 pseudarthrosis/nonunions and cervical degeneration and 
stenosis with neural foraminal impingement at C3-4.  She has failed extensive conservative care and p
resents now for removal and replacement of hardware and exploration of fusion and redo two-level ante
rior cervical diskectomy, arthrodesis and a new anterior cervical decompression and fusion at C3-4.  
The patient is being reinforced posteriorly from this procedure so that we minimize the risk of a rec
urring nonunion.



DESCRIPTION OF PROCEDURE:  After the anterior portion of the procedure was completed, the patient was
 repositioned prone with the head in the radiolucent Brand head ortiz.  The posterior cervical re
gion was prepped and draped in sterile fashion.  After fluoroscopic localization of the correct level
s, the subcutaneous and intramuscular tissues were infiltrated with local anesthesia.  A midline line
ar incision was then created from approximately C3 through C7.  This was carried down to the fascial 
layer, which was then incised using monopolar electrocautery and carried in a subperiosteal plane fabricio
ng the spinous processes and lamina bilaterally.  Following re-verification of the correct levels, th
e dissection was carried out over the facet joints.  Following this, lateral mass screw fixation was 
placed in a standard fashion from C3 through C6 and the O-arm neuronavigational system was utilized w
kapturem computer volumetric stereotactic navigation in order to place pedicle screws at C7.  Each individ
ual screw was tested neurophysiologically with monopolar electrostimulation and interpretation of the
 potentials by the surgeon.  The rods were then contoured and placed and secured under maximal lordos
is.  A drain was placed and the remainder of the local autograft and bone morphogenic protein was dontrell
tahir out laterally for posterolateral fusion from C3 through C7.  A drain was then placed.  The subcut
aneous and intramuscular tissues were re-infiltrated with local anesthesia.  The wound was closed in 
a layered fashion using interrupted Vicryl sutures followed by Steri-Strips on the skin.



COMPLICATIONS:  None.



DISPOSITION:  The patient is currently in the process of being repositioned for extubation.





Job #:  [id number]